# Patient Record
(demographics unavailable — no encounter records)

---

## 2025-05-16 NOTE — REASON FOR VISIT
[Initial Consultation] : an initial consultation visit for [Fever] : fever [Family Member] : family member [Patient] : patient [Mother] : mother [FreeTextEntry3] : EBV infection

## 2025-05-16 NOTE — CONSULT LETTER
[Dear  ___] : Dear  [unfilled], [Consult Letter:] : I had the pleasure of evaluating your patient, [unfilled]. [Please see my note below.] : Please see my note below. [Sincerely,] : Sincerely, [FreeTextEntry3] : Quynh Yo MD Pediatric Infectious Diseases [facility] [address] [place] [address] [phone] (FAX)

## 2025-05-16 NOTE — PHYSICAL EXAM
[Normal] : alert, oriented as age-appropriate, affect appropriate; no weakness, no facial asymmetry, moves all extremities normal gait-child older than 18 months [de-identified] : mildly erythematous tonsillar areas  - no exudate; TMs dull and slightly erythematous, not bulging

## 2025-05-16 NOTE — HISTORY OF PRESENT ILLNESS
[FreeTextEntry2] : Genet is a healthy 12 y F with asthma who has fever and was diagnosed with EBV infection. Fevers started 5/6/25 with daily fevers to 102 and has decreased to 101 range this past week. Since 5/11 she has been on amoxicillin for otitis media which was stopped yesterday when serology positive for acute EBV infection.. No pain but popping sound in both ears. With fever, she had sore throat and mild cough but no abdominal pain and no diarrhea, no skin rash, no headache, no difficulty swallowing. Currently,fever but normal appetite, normal activity.  zseen [facility] on 5/12/25 where RVP negative, U/A normal, EBV VCA Igm+, IgG+, EBNA-neg. PMH: otitis media, hx of COVID-19 with lingering cough, asthma triggered by URI, on prn meds.when sx or URI - including during the current illness.

## 2025-07-11 NOTE — HISTORY OF PRESENT ILLNESS
[FreeTextEntry2] : Genet is a 12y 7mo old F who was referred by her PCP and cardiology for abnormal lipids.  Growth charts are limited however available data points show height in the 50-75th percentile and weight in the 75-90th percentile. Labs done in March 2025 showed normal CBC, CMP. Lipids showed Cholesterol 211, triglycerides elevated 232, HDL 56, . Labs were done _fasting. TFTs were normal.